# Patient Record
Sex: FEMALE | Race: WHITE | NOT HISPANIC OR LATINO
[De-identification: names, ages, dates, MRNs, and addresses within clinical notes are randomized per-mention and may not be internally consistent; named-entity substitution may affect disease eponyms.]

---

## 2017-09-01 ENCOUNTER — RX RENEWAL (OUTPATIENT)
Age: 62
End: 2017-09-01

## 2017-10-06 ENCOUNTER — RX RENEWAL (OUTPATIENT)
Age: 62
End: 2017-10-06

## 2017-10-09 ENCOUNTER — APPOINTMENT (OUTPATIENT)
Dept: NEUROLOGY | Facility: CLINIC | Age: 62
End: 2017-10-09
Payer: MEDICARE

## 2017-10-09 VITALS
HEIGHT: 64 IN | DIASTOLIC BLOOD PRESSURE: 64 MMHG | WEIGHT: 100 LBS | SYSTOLIC BLOOD PRESSURE: 128 MMHG | BODY MASS INDEX: 17.07 KG/M2

## 2017-10-09 DIAGNOSIS — F17.200 NICOTINE DEPENDENCE, UNSPECIFIED, UNCOMPLICATED: ICD-10-CM

## 2017-10-09 DIAGNOSIS — Z87.891 PERSONAL HISTORY OF NICOTINE DEPENDENCE: ICD-10-CM

## 2017-10-09 DIAGNOSIS — I67.1 CEREBRAL ANEURYSM, NONRUPTURED: ICD-10-CM

## 2017-10-09 PROCEDURE — 99214 OFFICE O/P EST MOD 30 MIN: CPT

## 2017-10-09 RX ORDER — UBIDECARENONE/VIT E ACET 100MG-5
50 MCG CAPSULE ORAL
Refills: 0 | Status: ACTIVE | COMMUNITY

## 2017-12-18 ENCOUNTER — APPOINTMENT (OUTPATIENT)
Dept: NEUROLOGY | Facility: CLINIC | Age: 62
End: 2017-12-18
Payer: MEDICARE

## 2017-12-18 VITALS
BODY MASS INDEX: 17.07 KG/M2 | WEIGHT: 100 LBS | DIASTOLIC BLOOD PRESSURE: 72 MMHG | HEIGHT: 64 IN | SYSTOLIC BLOOD PRESSURE: 124 MMHG

## 2017-12-18 PROCEDURE — 99213 OFFICE O/P EST LOW 20 MIN: CPT

## 2018-01-09 ENCOUNTER — APPOINTMENT (OUTPATIENT)
Dept: PHYSICAL MEDICINE AND REHAB | Facility: CLINIC | Age: 63
End: 2018-01-09

## 2018-01-16 ENCOUNTER — RX RENEWAL (OUTPATIENT)
Age: 63
End: 2018-01-16

## 2018-01-31 ENCOUNTER — APPOINTMENT (OUTPATIENT)
Dept: PHYSICAL MEDICINE AND REHAB | Facility: CLINIC | Age: 63
End: 2018-01-31
Payer: MEDICARE

## 2018-01-31 DIAGNOSIS — Z60.2 PROBLEMS RELATED TO LIVING ALONE: ICD-10-CM

## 2018-01-31 DIAGNOSIS — Z80.9 FAMILY HISTORY OF MALIGNANT NEOPLASM, UNSPECIFIED: ICD-10-CM

## 2018-01-31 DIAGNOSIS — Z56.0 UNEMPLOYMENT, UNSPECIFIED: ICD-10-CM

## 2018-01-31 PROCEDURE — 99205 OFFICE O/P NEW HI 60 MIN: CPT

## 2018-01-31 RX ORDER — MENTHOL/CAMPHOR 0.5 %-0.5%
1000 LOTION (ML) TOPICAL
Refills: 0 | Status: ACTIVE | COMMUNITY

## 2018-01-31 RX ORDER — METHIMAZOLE 10 MG/1
10 TABLET ORAL
Refills: 0 | Status: ACTIVE | COMMUNITY

## 2018-01-31 RX ORDER — ALENDRONATE SODIUM 70 MG/1
70 TABLET ORAL
Refills: 0 | Status: ACTIVE | COMMUNITY

## 2018-01-31 RX ORDER — MULTIVIT-MIN/FA/LYCOPEN/LUTEIN .4-300-25
TABLET ORAL
Refills: 0 | Status: ACTIVE | COMMUNITY

## 2018-01-31 SDOH — SOCIAL STABILITY - SOCIAL INSECURITY: PROBLEMS RELATED TO LIVING ALONE: Z60.2

## 2018-01-31 SDOH — ECONOMIC STABILITY - INCOME SECURITY: UNEMPLOYMENT, UNSPECIFIED: Z56.0

## 2018-02-17 ENCOUNTER — MEDICATION RENEWAL (OUTPATIENT)
Age: 63
End: 2018-02-17

## 2018-02-20 ENCOUNTER — MEDICATION RENEWAL (OUTPATIENT)
Age: 63
End: 2018-02-20

## 2018-02-20 RX ORDER — ARMODAFINIL 150 MG/1
150 TABLET ORAL DAILY
Qty: 30 | Refills: 5 | Status: COMPLETED | COMMUNITY
Start: 2018-01-30 | End: 2018-02-20

## 2018-02-20 RX ORDER — MODAFINIL 100 MG/1
100 TABLET ORAL
Refills: 0 | Status: COMPLETED | COMMUNITY
End: 2018-02-20

## 2018-03-19 ENCOUNTER — APPOINTMENT (OUTPATIENT)
Dept: NEUROLOGY | Facility: CLINIC | Age: 63
End: 2018-03-19
Payer: MEDICARE

## 2018-03-19 VITALS
DIASTOLIC BLOOD PRESSURE: 82 MMHG | HEIGHT: 64 IN | WEIGHT: 100 LBS | SYSTOLIC BLOOD PRESSURE: 126 MMHG | BODY MASS INDEX: 17.07 KG/M2

## 2018-03-19 DIAGNOSIS — M21.371 FOOT DROP, RIGHT FOOT: ICD-10-CM

## 2018-03-19 DIAGNOSIS — M21.372 FOOT DROP, RIGHT FOOT: ICD-10-CM

## 2018-03-19 PROCEDURE — 99214 OFFICE O/P EST MOD 30 MIN: CPT

## 2018-04-16 ENCOUNTER — MEDICATION RENEWAL (OUTPATIENT)
Age: 63
End: 2018-04-16

## 2018-05-21 ENCOUNTER — APPOINTMENT (OUTPATIENT)
Dept: NEUROLOGY | Facility: CLINIC | Age: 63
End: 2018-05-21
Payer: MEDICARE

## 2018-05-21 VITALS
WEIGHT: 100 LBS | HEIGHT: 64 IN | DIASTOLIC BLOOD PRESSURE: 62 MMHG | SYSTOLIC BLOOD PRESSURE: 110 MMHG | BODY MASS INDEX: 17.07 KG/M2

## 2018-05-21 DIAGNOSIS — R25.2 CRAMP AND SPASM: ICD-10-CM

## 2018-05-21 PROCEDURE — 99214 OFFICE O/P EST MOD 30 MIN: CPT

## 2018-07-24 ENCOUNTER — RX RENEWAL (OUTPATIENT)
Age: 63
End: 2018-07-24

## 2018-08-21 ENCOUNTER — APPOINTMENT (OUTPATIENT)
Dept: NEUROLOGY | Facility: CLINIC | Age: 63
End: 2018-08-21
Payer: MEDICARE

## 2018-08-21 PROCEDURE — 99213 OFFICE O/P EST LOW 20 MIN: CPT

## 2018-09-24 ENCOUNTER — MEDICATION RENEWAL (OUTPATIENT)
Age: 63
End: 2018-09-24

## 2018-11-07 ENCOUNTER — RX RENEWAL (OUTPATIENT)
Age: 63
End: 2018-11-07

## 2018-11-26 ENCOUNTER — APPOINTMENT (OUTPATIENT)
Dept: NEUROLOGY | Facility: CLINIC | Age: 63
End: 2018-11-26
Payer: MEDICARE

## 2018-11-26 VITALS
DIASTOLIC BLOOD PRESSURE: 60 MMHG | SYSTOLIC BLOOD PRESSURE: 115 MMHG | WEIGHT: 100 LBS | BODY MASS INDEX: 17.07 KG/M2 | HEIGHT: 64 IN

## 2018-11-26 PROCEDURE — 99213 OFFICE O/P EST LOW 20 MIN: CPT

## 2018-12-17 ENCOUNTER — MEDICATION RENEWAL (OUTPATIENT)
Age: 63
End: 2018-12-17

## 2018-12-21 ENCOUNTER — APPOINTMENT (OUTPATIENT)
Dept: NEUROLOGY | Facility: CLINIC | Age: 63
End: 2018-12-21
Payer: MEDICARE

## 2018-12-21 PROCEDURE — 96366 THER/PROPH/DIAG IV INF ADDON: CPT

## 2018-12-21 PROCEDURE — 96365 THER/PROPH/DIAG IV INF INIT: CPT

## 2019-02-06 ENCOUNTER — APPOINTMENT (OUTPATIENT)
Dept: NEUROLOGY | Facility: CLINIC | Age: 64
End: 2019-02-06
Payer: MEDICARE

## 2019-02-06 PROCEDURE — 96365 THER/PROPH/DIAG IV INF INIT: CPT

## 2019-02-06 PROCEDURE — 96366 THER/PROPH/DIAG IV INF ADDON: CPT

## 2019-02-19 ENCOUNTER — MEDICATION RENEWAL (OUTPATIENT)
Age: 64
End: 2019-02-19

## 2019-02-26 ENCOUNTER — APPOINTMENT (OUTPATIENT)
Dept: NEUROLOGY | Facility: CLINIC | Age: 64
End: 2019-02-26
Payer: MEDICARE

## 2019-02-26 VITALS
BODY MASS INDEX: 17.07 KG/M2 | DIASTOLIC BLOOD PRESSURE: 66 MMHG | WEIGHT: 100 LBS | HEIGHT: 64 IN | SYSTOLIC BLOOD PRESSURE: 108 MMHG

## 2019-02-26 PROCEDURE — 99213 OFFICE O/P EST LOW 20 MIN: CPT

## 2019-02-26 NOTE — ASSESSMENT
[FreeTextEntry1] : This is a 63-year-old woman with secondary progressive multiple sclerosis. She is to worsen. She is currently a paraplegic and has fallen. She is essentially homebound at this point as she is unable to for physical therapy she can make doctors appointments but not much else. I feel that she requires full-time supervision. I will request of evaluation for an home health aide. In the past she had been resistant to having someone come in her home she is no longer resistance. She will likely need 24 hour/7 days a week coverage. She would also benefit from physical and occupational therapy in the home. I will also try to change her steroid infusion show the home. I will see her back in the office in 3 months, sooner should the need arise.

## 2019-02-26 NOTE — HISTORY OF PRESENT ILLNESS
[FreeTextEntry1] : 10/9/17:\par This is a 61-year-old woman returns today for followup of multiple sclerosis. This point its likely secondary progressive multiple sclerosis. She continues on 3 times a week Rebif. She appeared to tolerate the medication well. She seems to be getting increasingly frustrated as her condition deteriorates.At her last visit we tried tizanidine for spasticity which did not help and she stopped it. She is complaining that she is barely able to move her legs at this point. She does get up and do cooking but she gets exhausted. She describes cooking stuff peppers as is that of a one-day process no three-day process for one day she'll cook to meet the next day she couldn't arise in the last patient appeared to peppers. She does state that she gets up and does move around as much as she can but fatigue sets in much easier than it used to. She is not having any vision problems at this point. Her arms are little bit weak but not nearly as weak as her legs. She also has a history the past of intracerebral aneurysm status post surgery which is currently asymptomatic. She is here today for neurologic followup.\par \par Followup December 18, 2017:\par This is a 8-year-old woman with secondary progressive multiple sclerosis. She is to have weakness in her legs. She feels that occasionally her knees locked up on her. Since her last visit she's got electric wheelchair and scooter. She is able to get around now a lot better. She is here today with this in mind for followup visit.\par \par Followup March 19, 2018:\par This is a 62-year-old woman returns today for followup of multiple strokes as. She currently likely to secondary progressive state with relapse. Her legs continue to be weak. She mostly uses a wheelchair to get around but can use a walker for short periods of time before she becomes too fatigued. She is dealing with a lot of MS-related fatigue as well as her leg weakness. She maintains good upper body strength. She continues to have poor appetite and weight loss despite trial of medical marijuana to increase her appetite. She is here today for followup.\par \par Followup May 21, 2018:\par This is a 62-year-old woman who follows up today with multiple sclerosis. She has likely secondary progressive with relapse. She now uses a motorized scooter outside the home. She can use a walker for limited amounts in the house. Otherwise she is using a motorized wheelchair with short turning radius in the house. She is not having any new issues other than some intermittent coldness and cramping into her left leg. She is here today for neurologic followup.\par \par Followup August 21, 2018:\par This is a 62-year-old woman with multiple sclerosis who follows up today. She likely has secondary progressive multiple sclerosis with relapse. She continues on read as without any issues. She's able take it 3 times a week is not reporting any adverse effects. She continues to have severe weakness in her legs and is using a motorized scooter. She takes that she can move around a little with her walker when in her home. She ha complains of more fatigue however she is only taking Provigil once a day to twice a day as prescribed. She is here today for neurologic followup.  \par \par Followup November 26, 2018:\par This is a 63-year-old woman with likely secondary progressive multiple sclerosis who presents today for followup. She continues to be on Rebif with no issues. She continues to have bilateral leg weakness. She is nonambulatory and bound either wheelchair or scooter. She goes to physical and occupational therapy outside the home. She is receiving bimonthly treatment subsided Medrol. She wishes to increase it to monthly. She is here today for neurologic followup.\par \par Followup February 26, 2019:\par This is a 63-year-old woman who presents today for followup of multiple sclerosis. At this point to secondary progressive multiple stresses. Since her last visit she has fallen. She was unable to get up and was on the floor for about 24 hours. She now recognizes it is no longer safe for her to be alone. She remains paraplegic and cannot lift her legs and has decreased sensation in her legs. She is still able to use her arms. She is reporting decreased appetite. Overall she appears to have deteriorated significantly since her November 2018 visit. She is no longer able to go to physical and occupational therapy. She will continue with steroid treatment.

## 2019-02-26 NOTE — CONSULT LETTER
[Dear  ___] : Dear  [unfilled], [Courtesy Letter:] : I had the pleasure of seeing your patient, [unfilled], in my office today. [Please see my note below.] : Please see my note below. [Consult Closing:] : Thank you very much for allowing me to participate in the care of this patient.  If you have any questions, please do not hesitate to contact me. [Sincerely,] : Sincerely, [FreeTextEntry3] : Oscar Manuel M.D., Ph.D. DPN-N\par [facility]\par Neurology at [place]\par Medical Director of Stroke Services\par [facility]\par

## 2019-02-26 NOTE — REVIEW OF SYSTEMS
[As Noted in HPI] : as noted in HPI [Leg Weakness] : leg weakness [Inability to Walk] : inability to walk [Negative] : Heme/Lymph

## 2019-02-26 NOTE — PHYSICAL EXAM
[Person] : oriented to person [Place] : oriented to place [Time] : oriented to time [Remote Intact] : remote memory intact [Registration Intact] : recent registration memory intact [Span Intact] : the attention span was normal [Concentration Intact] : normal concentrating ability [Visual Intact] : visual attention was ~T not ~L decreased [Naming Objects] : no difficulty naming common objects [Repeating Phrases] : no difficulty repeating a phrase [Fluency] : fluency intact [Comprehension] : comprehension intact [Current Events] : adequate knowledge of current events [Past History] : adequate knowledge of personal past history [Cranial Nerves Optic (II)] : visual acuity intact bilaterally,  visual fields full to confrontation, pupils equal round and reactive to light [Cranial Nerves Oculomotor (III)] : extraocular motion intact [Cranial Nerves Trigeminal (V)] : facial sensation intact symmetrically [Cranial Nerves Facial (VII)] : face symmetrical [Cranial Nerves Vestibulocochlear (VIII)] : hearing was intact bilaterally [Cranial Nerves Glossopharyngeal (IX)] : tongue and palate midline [Cranial Nerves Accessory (XI - Cranial And Spinal)] : head turning and shoulder shrug symmetric [Cranial Nerves Hypoglossal (XII)] : there was no tongue deviation with protrusion [Involuntary Movements] : no involuntary movements were seen [No Muscle Atrophy] : normal bulk in all four extremities [Paraparesis (Lower Extremities)] : the patient was paraplegic [Paresis Pronator Drift Right-Sided] : no pronator drift on the right [Paresis Pronator Drift Left-Sided] : no pronator drift on the left [Motor Strength Upper Extremities Bilaterally] : strength was normal in both upper extremities [Sensation Tactile Decrease] : light touch was intact [Sensation Pain / Temperature Decrease] : pain and temperature was intact [Sensation Vibration Decrease] : vibration was intact [Proprioception] : proprioception was intact [Tremor] : no tremor present [Coordination - Dysmetria Impaired Finger-to-Nose Bilateral] : not present [3+] : Patella left 3+ [FreeTextEntry6] : Mild bilateral upper extremity weakness, no movement in lower extremities [FreeTextEntry8] : In wheelchair [Sclera] : the sclera and conjunctiva were normal [PERRL With Normal Accommodation] : pupils were equal in size, round, reactive to light, with normal accommodation [Extraocular Movements] : extraocular movements were intact [No APD] : no afferent pupillary defect [No ZOILA] : no internuclear ophthalmoplegia [Full Visual Field] : full visual field

## 2019-03-08 ENCOUNTER — APPOINTMENT (OUTPATIENT)
Dept: NEUROLOGY | Facility: CLINIC | Age: 64
End: 2019-03-08

## 2019-03-08 ENCOUNTER — RX RENEWAL (OUTPATIENT)
Age: 64
End: 2019-03-08

## 2019-05-06 ENCOUNTER — RX RENEWAL (OUTPATIENT)
Age: 64
End: 2019-05-06

## 2019-05-28 ENCOUNTER — APPOINTMENT (OUTPATIENT)
Dept: NEUROLOGY | Facility: CLINIC | Age: 64
End: 2019-05-28
Payer: MEDICARE

## 2019-05-28 VITALS
SYSTOLIC BLOOD PRESSURE: 118 MMHG | WEIGHT: 100 LBS | HEIGHT: 63.5 IN | DIASTOLIC BLOOD PRESSURE: 66 MMHG | BODY MASS INDEX: 17.5 KG/M2

## 2019-05-28 PROCEDURE — 99213 OFFICE O/P EST LOW 20 MIN: CPT

## 2019-05-28 NOTE — HISTORY OF PRESENT ILLNESS
[FreeTextEntry1] : 10/9/17:\par This is a 61-year-old woman returns today for followup of multiple sclerosis. This point its likely secondary progressive multiple sclerosis. She continues on 3 times a week Rebif. She appeared to tolerate the medication well. She seems to be getting increasingly frustrated as her condition deteriorates.At her last visit we tried tizanidine for spasticity which did not help and she stopped it. She is complaining that she is barely able to move her legs at this point. She does get up and do cooking but she gets exhausted. She describes cooking stuff peppers as is that of a one-day process no three-day process for one day she'll cook to meet the next day she couldn't arise in the last patient appeared to peppers. She does state that she gets up and does move around as much as she can but fatigue sets in much easier than it used to. She is not having any vision problems at this point. Her arms are little bit weak but not nearly as weak as her legs. She also has a history the past of intracerebral aneurysm status post surgery which is currently asymptomatic. She is here today for neurologic followup.\par \par Followup December 18, 2017:\par This is a 8-year-old woman with secondary progressive multiple sclerosis. She is to have weakness in her legs. She feels that occasionally her knees locked up on her. Since her last visit she's got electric wheelchair and scooter. She is able to get around now a lot better. She is here today with this in mind for followup visit.\par \par Followup March 19, 2018:\par This is a 62-year-old woman returns today for followup of multiple strokes as. She currently likely to secondary progressive state with relapse. Her legs continue to be weak. She mostly uses a wheelchair to get around but can use a walker for short periods of time before she becomes too fatigued. She is dealing with a lot of MS-related fatigue as well as her leg weakness. She maintains good upper body strength. She continues to have poor appetite and weight loss despite trial of medical marijuana to increase her appetite. She is here today for followup.\par \par Followup May 21, 2018:\par This is a 62-year-old woman who follows up today with multiple sclerosis. She has likely secondary progressive with relapse. She now uses a motorized scooter outside the home. She can use a walker for limited amounts in the house. Otherwise she is using a motorized wheelchair with short turning radius in the house. She is not having any new issues other than some intermittent coldness and cramping into her left leg. She is here today for neurologic followup.\par \par Followup August 21, 2018:\par This is a 62-year-old woman with multiple sclerosis who follows up today. She likely has secondary progressive multiple sclerosis with relapse. She continues on read as without any issues. She's able take it 3 times a week is not reporting any adverse effects. She continues to have severe weakness in her legs and is using a motorized scooter. She takes that she can move around a little with her walker when in her home. She ha complains of more fatigue however she is only taking Provigil once a day to twice a day as prescribed. She is here today for neurologic followup.  \par \par Followup November 26, 2018:\par This is a 63-year-old woman with likely secondary progressive multiple sclerosis who presents today for followup. She continues to be on Rebif with no issues. She continues to have bilateral leg weakness. She is nonambulatory and bound either wheelchair or scooter. She goes to physical and occupational therapy outside the home. She is receiving bimonthly treatment subsided Medrol. She wishes to increase it to monthly. She is here today for neurologic followup.\par \par Followup February 26, 2019:\par This is a 63-year-old woman who presents today for followup of multiple sclerosis. At this point to secondary progressive multiple stresses. Since her last visit she has fallen. She was unable to get up and was on the floor for about 24 hours. She now recognizes it is no longer safe for her to be alone. She remains paraplegic and cannot lift her legs and has decreased sensation in her legs. She is still able to use her arms. She is reporting decreased appetite. Overall she appears to have deteriorated significantly since her November 2018 visit. She is no longer able to go to physical and occupational therapy. She will continue with steroid treatment.\par \par Followup May 28, 2019:\par This is a 63-year-old woman who presents today for followup of multiple sclerosis. It is likely secondary progressive stage. She continues to be paraplegic. She is confined to a wheelchair at this point. She tried physical therapy in the home which is not as good as outpatient physical therapy. She wishes to try to go back to outpatient physical and occupational therapies. She is here today for neurologic followup.

## 2019-05-28 NOTE — PHYSICAL EXAM
[Place] : oriented to place [Time] : oriented to time [Person] : oriented to person [Remote Intact] : remote memory intact [Span Intact] : the attention span was normal [Registration Intact] : recent registration memory intact [Visual Intact] : visual attention was ~T not ~L decreased [Naming Objects] : no difficulty naming common objects [Repeating Phrases] : no difficulty repeating a phrase [Concentration Intact] : normal concentrating ability [Fluency] : fluency intact [Comprehension] : comprehension intact [Current Events] : adequate knowledge of current events [Cranial Nerves Optic (II)] : visual acuity intact bilaterally,  visual fields full to confrontation, pupils equal round and reactive to light [Past History] : adequate knowledge of personal past history [Cranial Nerves Oculomotor (III)] : extraocular motion intact [Cranial Nerves Vestibulocochlear (VIII)] : hearing was intact bilaterally [Cranial Nerves Facial (VII)] : face symmetrical [Cranial Nerves Trigeminal (V)] : facial sensation intact symmetrically [Involuntary Movements] : no involuntary movements were seen [Cranial Nerves Hypoglossal (XII)] : there was no tongue deviation with protrusion [Cranial Nerves Accessory (XI - Cranial And Spinal)] : head turning and shoulder shrug symmetric [Cranial Nerves Glossopharyngeal (IX)] : tongue and palate midline [Paresis Pronator Drift Right-Sided] : no pronator drift on the right [Paraparesis (Lower Extremities)] : the patient was paraplegic [No Muscle Atrophy] : normal bulk in all four extremities [Motor Strength Upper Extremities Bilaterally] : strength was normal in both upper extremities [Paresis Pronator Drift Left-Sided] : no pronator drift on the left [Sensation Tactile Decrease] : light touch was intact [Proprioception] : proprioception was intact [Sensation Pain / Temperature Decrease] : pain and temperature was intact [Sensation Vibration Decrease] : vibration was intact [Coordination - Dysmetria Impaired Finger-to-Nose Bilateral] : not present [Tremor] : no tremor present [3+] : Patella right 3+ [FreeTextEntry8] : In wheelchair [FreeTextEntry6] : Mild bilateral upper extremity weakness, no movement in lower extremities [Sclera] : the sclera and conjunctiva were normal [PERRL With Normal Accommodation] : pupils were equal in size, round, reactive to light, with normal accommodation [Extraocular Movements] : extraocular movements were intact [No APD] : no afferent pupillary defect [No ZOILA] : no internuclear ophthalmoplegia [Full Visual Field] : full visual field

## 2019-05-28 NOTE — CONSULT LETTER
[Dear  ___] : Dear  [unfilled], [Please see my note below.] : Please see my note below. [Courtesy Letter:] : I had the pleasure of seeing your patient, [unfilled], in my office today. [Consult Closing:] : Thank you very much for allowing me to participate in the care of this patient.  If you have any questions, please do not hesitate to contact me. [FreeTextEntry3] : Oscar Manuel M.D., Ph.D. DPN-N\par [facility]\par Neurology at [place]\par Medical Director of Stroke Services\par [facility]\par  [Sincerely,] : Sincerely,

## 2019-05-28 NOTE — ASSESSMENT
[FreeTextEntry1] : This is a 63-year-old woman with multiple sclerosis. She is currently stable since her last visit 3 months ago. At this point we'll continue current treatment and I have given her a prescription for physical and occupational therapy as an outpatient. I will see her back for followup in 3 months, sooner should the need arise.

## 2019-08-27 ENCOUNTER — APPOINTMENT (OUTPATIENT)
Dept: NEUROLOGY | Facility: CLINIC | Age: 64
End: 2019-08-27

## 2019-09-09 ENCOUNTER — MEDICATION RENEWAL (OUTPATIENT)
Age: 64
End: 2019-09-09

## 2019-09-17 ENCOUNTER — APPOINTMENT (OUTPATIENT)
Dept: NEUROLOGY | Facility: CLINIC | Age: 64
End: 2019-09-17
Payer: MEDICARE

## 2019-09-17 VITALS
DIASTOLIC BLOOD PRESSURE: 70 MMHG | HEIGHT: 63.5 IN | WEIGHT: 93 LBS | BODY MASS INDEX: 16.27 KG/M2 | SYSTOLIC BLOOD PRESSURE: 100 MMHG

## 2019-09-17 PROCEDURE — 99214 OFFICE O/P EST MOD 30 MIN: CPT

## 2019-09-17 NOTE — CONSULT LETTER
[Dear  ___] : Dear  [unfilled], [Please see my note below.] : Please see my note below. [Courtesy Letter:] : I had the pleasure of seeing your patient, [unfilled], in my office today. [Sincerely,] : Sincerely, [Consult Closing:] : Thank you very much for allowing me to participate in the care of this patient.  If you have any questions, please do not hesitate to contact me. [FreeTextEntry3] : Oscar Manuel M.D., Ph.D. DPN-N\par [facility]\par Neurology at [place]\par Medical Director of Stroke Services\par [facility]\par

## 2019-09-17 NOTE — HISTORY OF PRESENT ILLNESS
[FreeTextEntry1] : 10/9/17:\par This is a 61-year-old woman returns today for followup of multiple sclerosis. This point its likely secondary progressive multiple sclerosis. She continues on 3 times a week Rebif. She appeared to tolerate the medication well. She seems to be getting increasingly frustrated as her condition deteriorates.At her last visit we tried tizanidine for spasticity which did not help and she stopped it. She is complaining that she is barely able to move her legs at this point. She does get up and do cooking but she gets exhausted. She describes cooking stuff peppers as is that of a one-day process no three-day process for one day she'll cook to meet the next day she couldn't arise in the last patient appeared to peppers. She does state that she gets up and does move around as much as she can but fatigue sets in much easier than it used to. She is not having any vision problems at this point. Her arms are little bit weak but not nearly as weak as her legs. She also has a history the past of intracerebral aneurysm status post surgery which is currently asymptomatic. She is here today for neurologic followup.\par \par Followup December 18, 2017:\par This is a 8-year-old woman with secondary progressive multiple sclerosis. She is to have weakness in her legs. She feels that occasionally her knees locked up on her. Since her last visit she's got electric wheelchair and scooter. She is able to get around now a lot better. She is here today with this in mind for followup visit.\par \par Followup March 19, 2018:\par This is a 62-year-old woman returns today for followup of multiple strokes as. She currently likely to secondary progressive state with relapse. Her legs continue to be weak. She mostly uses a wheelchair to get around but can use a walker for short periods of time before she becomes too fatigued. She is dealing with a lot of MS-related fatigue as well as her leg weakness. She maintains good upper body strength. She continues to have poor appetite and weight loss despite trial of medical marijuana to increase her appetite. She is here today for followup.\par \par Followup May 21, 2018:\par This is a 62-year-old woman who follows up today with multiple sclerosis. She has likely secondary progressive with relapse. She now uses a motorized scooter outside the home. She can use a walker for limited amounts in the house. Otherwise she is using a motorized wheelchair with short turning radius in the house. She is not having any new issues other than some intermittent coldness and cramping into her left leg. She is here today for neurologic followup.\par \par Followup August 21, 2018:\par This is a 62-year-old woman with multiple sclerosis who follows up today. She likely has secondary progressive multiple sclerosis with relapse. She continues on read as without any issues. She's able take it 3 times a week is not reporting any adverse effects. She continues to have severe weakness in her legs and is using a motorized scooter. She takes that she can move around a little with her walker when in her home. She ha complains of more fatigue however she is only taking Provigil once a day to twice a day as prescribed. She is here today for neurologic followup.  \par \par Followup November 26, 2018:\par This is a 63-year-old woman with likely secondary progressive multiple sclerosis who presents today for followup. She continues to be on Rebif with no issues. She continues to have bilateral leg weakness. She is nonambulatory and bound either wheelchair or scooter. She goes to physical and occupational therapy outside the home. She is receiving bimonthly treatment subsided Medrol. She wishes to increase it to monthly. She is here today for neurologic followup.\par \par Followup February 26, 2019:\par This is a 63-year-old woman who presents today for followup of multiple sclerosis. At this point to secondary progressive multiple stresses. Since her last visit she has fallen. She was unable to get up and was on the floor for about 24 hours. She now recognizes it is no longer safe for her to be alone. She remains paraplegic and cannot lift her legs and has decreased sensation in her legs. She is still able to use her arms. She is reporting decreased appetite. Overall she appears to have deteriorated significantly since her November 2018 visit. She is no longer able to go to physical and occupational therapy. She will continue with steroid treatment.\par \par Followup May 28, 2019:\par This is a 63-year-old woman who presents today for followup of multiple sclerosis. It is likely secondary progressive stage. She continues to be paraplegic. She is confined to a wheelchair at this point. She tried physical therapy in the home which is not as good as outpatient physical therapy. She wishes to try to go back to outpatient physical and occupational therapies. She is here today for neurologic followup.\par \par Followup September 17, 2019:\par This is a 63-year-old woman who presents today for followup of multiple sclerosis. At this point in secondary progressive multiple sclerosis. She continues to have paraplegia with diffuse weakness and also involving her upper extremities. She is complaining of lower extremity edema. He tried stockings which she did not care for her because it was uncomfortable. She states that she's been on diuretics as well with minimal relief. She states that the additional weight of the edema is causing her difficulty in transferring from wheelchair to commode. She is fallen a few times on wheelchair when she's been unsupervised. Currently she private pay three-eighths this to be with her for varying amounts of hours during the day. She is having increasingly worsening problems at night because she does have assistance. She is unable to go to physical or occupational therapy due to the falls and difficulty getting out of the home. She is here today for neurologic followup.

## 2019-09-17 NOTE — PHYSICAL EXAM
[Place] : oriented to place [Person] : oriented to person [Remote Intact] : remote memory intact [Time] : oriented to time [Registration Intact] : recent registration memory intact [Concentration Intact] : normal concentrating ability [Span Intact] : the attention span was normal [Visual Intact] : visual attention was ~T not ~L decreased [Naming Objects] : no difficulty naming common objects [Repeating Phrases] : no difficulty repeating a phrase [Fluency] : fluency intact [Current Events] : adequate knowledge of current events [Comprehension] : comprehension intact [Past History] : adequate knowledge of personal past history [Cranial Nerves Optic (II)] : visual acuity intact bilaterally,  visual fields full to confrontation, pupils equal round and reactive to light [Cranial Nerves Oculomotor (III)] : extraocular motion intact [Cranial Nerves Vestibulocochlear (VIII)] : hearing was intact bilaterally [Cranial Nerves Trigeminal (V)] : facial sensation intact symmetrically [Cranial Nerves Facial (VII)] : face symmetrical [Cranial Nerves Accessory (XI - Cranial And Spinal)] : head turning and shoulder shrug symmetric [Cranial Nerves Glossopharyngeal (IX)] : tongue and palate midline [Cranial Nerves Hypoglossal (XII)] : there was no tongue deviation with protrusion [Involuntary Movements] : no involuntary movements were seen [Paraparesis (Lower Extremities)] : the patient was paraplegic [Motor Strength Lower Extremities Bilaterally] : there was weakness in both lower extremities [Sensation Tactile Decrease] : light touch was intact [Sensation Pain / Temperature Decrease] : pain and temperature was intact [Non-ambulatory] : Non-ambulatory [Sensation Vibration Decrease] : vibration was intact [Proprioception] : proprioception was intact [3+] : Patella right 3+ [PERRL With Normal Accommodation] : pupils were equal in size, round, reactive to light, with normal accommodation [Sclera] : the sclera and conjunctiva were normal [Extraocular Movements] : extraocular movements were intact [No ZOILA] : no internuclear ophthalmoplegia [No APD] : no afferent pupillary defect [Full Visual Field] : full visual field [Paresis Pronator Drift Right-Sided] : no pronator drift on the right [Paresis Pronator Drift Left-Sided] : no pronator drift on the left [Motor Strength Upper Extremities Bilaterally] : strength was normal in both upper extremities [Tremor] : no tremor present [Coordination - Dysmetria Impaired Finger-to-Nose Bilateral] : not present [FreeTextEntry6] : Mild bilateral upper extremity weakness, no movement in lower extremities [FreeTextEntry8] : In wheelchair

## 2019-09-17 NOTE — ASSESSMENT
[FreeTextEntry1] : This is a 63-year-old woman with secondary progressive multiple sclerosis. At this time is unsafe for her to be at home alone. I will make efforts U. assist her in getting 24 7 coverage with home health aides. She is ready fall and needed to call neighbors to get her out of harms way and has injured herself as a result of these falls. She will continue rebate for the multiple sclerosis as well as pulsed steroids. We'll reduce the steroid dose to every other month and she states she has mild osteoporosis. I will see her back in the office in 3 months, sooner should the need arise.

## 2019-09-29 ENCOUNTER — RX RENEWAL (OUTPATIENT)
Age: 64
End: 2019-09-29

## 2019-12-17 ENCOUNTER — APPOINTMENT (OUTPATIENT)
Dept: NEUROLOGY | Facility: CLINIC | Age: 64
End: 2019-12-17

## 2020-02-10 ENCOUNTER — RX RENEWAL (OUTPATIENT)
Age: 65
End: 2020-02-10

## 2021-01-12 ENCOUNTER — RX RENEWAL (OUTPATIENT)
Age: 66
End: 2021-01-12

## 2021-01-21 ENCOUNTER — APPOINTMENT (OUTPATIENT)
Dept: NEUROLOGY | Facility: CLINIC | Age: 66
End: 2021-01-21
Payer: MEDICARE

## 2021-01-21 DIAGNOSIS — G82.20 PARAPLEGIA, UNSPECIFIED: ICD-10-CM

## 2021-01-21 PROCEDURE — 99213 OFFICE O/P EST LOW 20 MIN: CPT | Mod: 95

## 2021-01-21 RX ORDER — MODAFINIL 100 MG/1
100 TABLET ORAL
Qty: 180 | Refills: 1 | Status: COMPLETED | COMMUNITY
Start: 2018-02-20 | End: 2021-01-21

## 2021-01-21 RX ORDER — INTERFERON BETA-1A 44 UG/.5ML
44 INJECTION, SOLUTION SUBCUTANEOUS
Refills: 0 | Status: COMPLETED | COMMUNITY
End: 2021-01-21

## 2021-01-21 RX ORDER — METHYLPREDNISOLONE SODIUM SUCCINATE 1 G/16ML
1000 INJECTION, POWDER, LYOPHILIZED, FOR SOLUTION INTRAMUSCULAR; INTRAVENOUS
Qty: 1000 | Refills: 5 | Status: COMPLETED | COMMUNITY
Start: 2018-11-30 | End: 2021-01-21

## 2021-01-21 NOTE — CONSULT LETTER
[Dear  ___] : Dear  [unfilled], [Courtesy Letter:] : I had the pleasure of seeing your patient, [unfilled], in my office today. [Please see my note below.] : Please see my note below. [Consult Closing:] : Thank you very much for allowing me to participate in the care of this patient.  If you have any questions, please do not hesitate to contact me. [Sincerely,] : Sincerely, [FreeTextEntry3] : Oscar Manuel M.D., Ph.D. DPN-N\par [facility]\par Neurology at [place]\par Medical Director of Stroke Services\par [facility]\par

## 2021-01-21 NOTE — HISTORY OF PRESENT ILLNESS
[Home] : at home, [unfilled] , at the time of the visit. [Medical Office: ([facility])___] : at the medical office located in  [Formal Caregiver] : formal caregiver [Verbal consent obtained from patient] : the patient, [unfilled] [FreeTextEntry1] : 10/9/17:\par This is a 61-year-old woman returns today for followup of multiple sclerosis. This point its likely secondary progressive multiple sclerosis. She continues on 3 times a week Rebif. She appeared to tolerate the medication well. She seems to be getting increasingly frustrated as her condition deteriorates.At her last visit we tried tizanidine for spasticity which did not help and she stopped it. She is complaining that she is barely able to move her legs at this point. She does get up and do cooking but she gets exhausted. She describes cooking stuff peppers as is that of a one-day process no three-day process for one day she'll cook to meet the next day she couldn't arise in the last patient appeared to peppers. She does state that she gets up and does move around as much as she can but fatigue sets in much easier than it used to. She is not having any vision problems at this point. Her arms are little bit weak but not nearly as weak as her legs. She also has a history the past of intracerebral aneurysm status post surgery which is currently asymptomatic. She is here today for neurologic followup.\par \par Followup December 18, 2017:\par This is a 8-year-old woman with secondary progressive multiple sclerosis. She is to have weakness in her legs. She feels that occasionally her knees locked up on her. Since her last visit she's got electric wheelchair and scooter. She is able to get around now a lot better. She is here today with this in mind for followup visit.\par \par Followup March 19, 2018:\par This is a 62-year-old woman returns today for followup of multiple strokes as. She currently likely to secondary progressive state with relapse. Her legs continue to be weak. She mostly uses a wheelchair to get around but can use a walker for short periods of time before she becomes too fatigued. She is dealing with a lot of MS-related fatigue as well as her leg weakness. She maintains good upper body strength. She continues to have poor appetite and weight loss despite trial of medical marijuana to increase her appetite. She is here today for followup.\par \par Followup May 21, 2018:\par This is a 62-year-old woman who follows up today with multiple sclerosis. She has likely secondary progressive with relapse. She now uses a motorized scooter outside the home. She can use a walker for limited amounts in the house. Otherwise she is using a motorized wheelchair with short turning radius in the house. She is not having any new issues other than some intermittent coldness and cramping into her left leg. She is here today for neurologic followup.\par \par Followup August 21, 2018:\par This is a 62-year-old woman with multiple sclerosis who follows up today. She likely has secondary progressive multiple sclerosis with relapse. She continues on read as without any issues. She's able take it 3 times a week is not reporting any adverse effects. She continues to have severe weakness in her legs and is using a motorized scooter. She takes that she can move around a little with her walker when in her home. She ha complains of more fatigue however she is only taking Provigil once a day to twice a day as prescribed. She is here today for neurologic followup.  \par \par Followup November 26, 2018:\par This is a 63-year-old woman with likely secondary progressive multiple sclerosis who presents today for followup. She continues to be on Rebif with no issues. She continues to have bilateral leg weakness. She is nonambulatory and bound either wheelchair or scooter. She goes to physical and occupational therapy outside the home. She is receiving bimonthly treatment subsided Medrol. She wishes to increase it to monthly. She is here today for neurologic followup.\par \par Followup February 26, 2019:\par This is a 63-year-old woman who presents today for followup of multiple sclerosis. At this point to secondary progressive multiple stresses. Since her last visit she has fallen. She was unable to get up and was on the floor for about 24 hours. She now recognizes it is no longer safe for her to be alone. She remains paraplegic and cannot lift her legs and has decreased sensation in her legs. She is still able to use her arms. She is reporting decreased appetite. Overall she appears to have deteriorated significantly since her November 2018 visit. She is no longer able to go to physical and occupational therapy. She will continue with steroid treatment.\par \par Followup May 28, 2019:\par This is a 63-year-old woman who presents today for followup of multiple sclerosis. It is likely secondary progressive stage. She continues to be paraplegic. She is confined to a wheelchair at this point. She tried physical therapy in the home which is not as good as outpatient physical therapy. She wishes to try to go back to outpatient physical and occupational therapies. She is here today for neurologic followup.\par \par Followup September 17, 2019:\par This is a 63-year-old woman who presents today for followup of multiple sclerosis. At this point in secondary progressive multiple sclerosis. She continues to have paraplegia with diffuse weakness and also involving her upper extremities. She is complaining of lower extremity edema. He tried stockings which she did not care for her because it was uncomfortable. She states that she's been on diuretics as well with minimal relief. She states that the additional weight of the edema is causing her difficulty in transferring from wheelchair to commode. She is fallen a few times on wheelchair when she's been unsupervised. Currently she private pay three-eighths this to be with her for varying amounts of hours during the day. She is having increasingly worsening problems at night because she does have assistance. She is unable to go to physical or occupational therapy due to the falls and difficulty getting out of the home. She is here today for neurologic followup.\par \par Followup January 21, 2021:\par This is a 65-year-old woman who presents today for followup of multiple sclerosis. She is seen by video visit during the corona virus outbreak. Verbal consent is obtained at the beginning of the visit. She has not been seen since September of 2019. She continues to have what appears to be secondary progressive multiple sclerosis. Her leg weakness has gotten to a point now where she cannot ambulate with a rolling walker and is only using a wheelchair. He has having increasing weakness in her arms as well which is limiting her ability to transfer. She is relying on the assistance of an 8 to get in and out of bed and in and out of a chair. She states that she has fallen and has been in rehabilitation subsequent to her fall. She continues to take her Rebif 3 times a week and does not wish to switch medication. She is also utilizing gabapentin 1200 mg 2 to 3 times a day. She's here today for neurologic followup by video visit.

## 2021-01-21 NOTE — REVIEW OF SYSTEMS
[As Noted in HPI] : as noted in HPI [Arm Weakness] : arm weakness [Leg Weakness] : leg weakness [Inability to Walk] : inability to walk [Negative] : Heme/Lymph

## 2021-01-21 NOTE — ASSESSMENT
[FreeTextEntry1] : This is a 65-year-old woman with secondary progressive multiple sclerosis. At this point she continues to have full worsening weakness in her legs and her arms are now affected. She is reliant on a full-time care with aids to help assist with transferring which is now impossible. She wishes to continue Rebif as opposed to switching to newer medications. She will continue Rebif 44 mcg 3 times per week. I have renewed her gabapentin 1200 mg to 3 times per day. I will see her back in 6 months, sooner should the need arise.

## 2021-01-21 NOTE — PHYSICAL EXAM
[FreeTextEntry1] : Neurologic examination was limited due to the video call.\par \par Mental status: Awake and alert fully oriented to person place and time. There is no aphasia.\par \par Cranial nerves:  Full extraocular movements. There is no nystagmus. Normal facial sensation and motor function. Tongue was in the midline.\par \par Motor: no pronator drift  bilaterally., b/l UE weakness is reported\par \par Sensation: Light touch was intact \par \par Coordination: Deferred\par \par Gait: paraplegic\par

## 2022-10-29 NOTE — REVIEW OF SYSTEMS
no [As Noted in HPI] : as noted in HPI [Leg Weakness] : leg weakness [Inability to Walk] : inability to walk [Ataxia] : ataxia [Negative] : Integumentary

## 2023-01-01 ENCOUNTER — APPOINTMENT (OUTPATIENT)
Dept: OTOLARYNGOLOGY | Facility: CLINIC | Age: 68
End: 2023-01-01
Payer: MEDICARE

## 2023-01-01 ENCOUNTER — APPOINTMENT (OUTPATIENT)
Dept: NEUROLOGY | Facility: CLINIC | Age: 68
End: 2023-01-01
Payer: MEDICARE

## 2023-01-01 ENCOUNTER — APPOINTMENT (OUTPATIENT)
Dept: OTOLARYNGOLOGY | Facility: CLINIC | Age: 68
End: 2023-01-01

## 2023-01-01 ENCOUNTER — RX CHANGE (OUTPATIENT)
Age: 68
End: 2023-01-01

## 2023-01-01 ENCOUNTER — NON-APPOINTMENT (OUTPATIENT)
Age: 68
End: 2023-01-01

## 2023-01-01 VITALS
SYSTOLIC BLOOD PRESSURE: 108 MMHG | BODY MASS INDEX: 16.48 KG/M2 | DIASTOLIC BLOOD PRESSURE: 70 MMHG | HEIGHT: 63 IN | WEIGHT: 93 LBS

## 2023-01-01 VITALS
BODY MASS INDEX: 16.83 KG/M2 | HEIGHT: 63 IN | WEIGHT: 95 LBS | HEART RATE: 81 BPM | SYSTOLIC BLOOD PRESSURE: 142 MMHG | DIASTOLIC BLOOD PRESSURE: 88 MMHG

## 2023-01-01 VITALS
HEIGHT: 63 IN | DIASTOLIC BLOOD PRESSURE: 70 MMHG | BODY MASS INDEX: 16.83 KG/M2 | WEIGHT: 95 LBS | SYSTOLIC BLOOD PRESSURE: 104 MMHG

## 2023-01-01 VITALS
DIASTOLIC BLOOD PRESSURE: 70 MMHG | HEIGHT: 63 IN | SYSTOLIC BLOOD PRESSURE: 100 MMHG | WEIGHT: 95 LBS | BODY MASS INDEX: 16.83 KG/M2

## 2023-01-01 DIAGNOSIS — G35 MULTIPLE SCLEROSIS: ICD-10-CM

## 2023-01-01 LAB
DEPRECATED KAPPA LC FREE/LAMBDA SER: 1.25 RATIO
HBV CORE IGG+IGM SER QL: NONREACTIVE
HBV CORE IGM SER QL: NONREACTIVE
HBV SURFACE AB SER QL: NONREACTIVE
HBV SURFACE AG SER QL: NONREACTIVE
IGA SER QL IEP: 180 MG/DL
IGA SER QL IEP: 180 MG/DL
IGG SER QL IEP: 1103 MG/DL
IGM SER QL IEP: 192 MG/DL
IGM SER QL IEP: 192 MG/DL
KAPPA LC CSF-MCNC: 1.64 MG/DL
KAPPA LC SERPL-MCNC: 2.05 MG/DL

## 2023-01-01 PROCEDURE — 92507 TX SP LANG VOICE COMM INDIV: CPT | Mod: GN

## 2023-01-01 PROCEDURE — 92610 EVALUATE SWALLOWING FUNCTION: CPT | Mod: GN

## 2023-01-01 PROCEDURE — 99204 OFFICE O/P NEW MOD 45 MIN: CPT | Mod: 25

## 2023-01-01 PROCEDURE — 99213 OFFICE O/P EST LOW 20 MIN: CPT

## 2023-01-01 PROCEDURE — 31575 DIAGNOSTIC LARYNGOSCOPY: CPT

## 2023-01-01 PROCEDURE — 99214 OFFICE O/P EST MOD 30 MIN: CPT

## 2023-01-01 RX ORDER — OCRELIZUMAB 300 MG/10ML
300 INJECTION INTRAVENOUS
Qty: 2 | Refills: 0 | Status: COMPLETED | COMMUNITY
Start: 2023-01-01 | End: 2023-01-01

## 2023-01-01 RX ORDER — GABAPENTIN 600 MG/1
600 TABLET, COATED ORAL
Qty: 360 | Refills: 3 | Status: DISCONTINUED | COMMUNITY
Start: 2018-01-16 | End: 2023-01-01

## 2023-01-01 RX ORDER — GABAPENTIN 300 MG/1
300 CAPSULE ORAL 3 TIMES DAILY
Qty: 180 | Refills: 5 | Status: ACTIVE | COMMUNITY
Start: 2023-01-01 | End: 1900-01-01

## 2023-01-01 RX ORDER — OCRELIZUMAB 300 MG/10ML
300 INJECTION INTRAVENOUS
Qty: 2 | Refills: 1 | Status: COMPLETED | COMMUNITY
Start: 2023-01-01 | End: 2023-01-01

## 2023-01-01 RX ORDER — GABAPENTIN 400 MG/1
CAPSULE ORAL
Refills: 0 | Status: DISCONTINUED | COMMUNITY
End: 2023-01-01

## 2023-01-01 RX ORDER — MEGESTROL ACETATE 20 MG/1
20 TABLET ORAL DAILY
Qty: 30 | Refills: 5 | Status: DISCONTINUED | COMMUNITY
Start: 2023-01-01 | End: 2023-01-01

## 2023-01-01 RX ORDER — INTERFERON BETA-1A 44 UG/.5ML
INJECTION, SOLUTION SUBCUTANEOUS
Qty: 36 | Refills: 3 | Status: DISCONTINUED | COMMUNITY
Start: 2019-03-08 | End: 2023-01-01

## 2023-01-01 RX ORDER — MEGESTROL ACETATE 20 MG/1
20 TABLET ORAL
Qty: 90 | Refills: 1 | Status: ACTIVE | COMMUNITY
Start: 1900-01-01 | End: 1900-01-01

## 2023-01-01 RX ORDER — GABAPENTIN 250 MG/5ML
250 SOLUTION ORAL 3 TIMES DAILY
Qty: 1080 | Refills: 1 | Status: COMPLETED | COMMUNITY
Start: 2023-01-01 | End: 2023-01-01

## 2023-03-10 NOTE — ASSESSMENT
[FreeTextEntry1] : This is a 67-year-old woman with advanced progressive multiple sclerosis.  At this point I would like to switch her to Ocrevus.  She is unable to swallow pills well therefore I cannot provide oral medications.  She has trouble finding injection sites given her cachexia therefore platform drugs as well as Kesimpta is not able to be prescribed.  Will order hepatitis B's titers as well as quantitative immunoglobulins for her.  If okay will start Ocrevus.  I will see her back in 3 months for follow-up.  I will also do MRIs of brain, cervical and thoracic spines prior to starting the medication.  Consideration should be made for DVT prevention.

## 2023-03-10 NOTE — HISTORY OF PRESENT ILLNESS
[FreeTextEntry1] : 10/9/17:\par This is a 61-year-old woman returns today for followup of multiple sclerosis. This point its likely secondary progressive multiple sclerosis. She continues on 3 times a week Rebif. She appeared to tolerate the medication well. She seems to be getting increasingly frustrated as her condition deteriorates.At her last visit we tried tizanidine for spasticity which did not help and she stopped it. She is complaining that she is barely able to move her legs at this point. She does get up and do cooking but she gets exhausted. She describes cooking stuff peppers as is that of a one-day process no three-day process for one day she'll cook to meet the next day she couldn't arise in the last patient appeared to peppers. She does state that she gets up and does move around as much as she can but fatigue sets in much easier than it used to. She is not having any vision problems at this point. Her arms are little bit weak but not nearly as weak as her legs. She also has a history the past of intracerebral aneurysm status post surgery which is currently asymptomatic. She is here today for neurologic followup.\par \par Followup December 18, 2017:\par This is a 8-year-old woman with secondary progressive multiple sclerosis. She is to have weakness in her legs. She feels that occasionally her knees locked up on her. Since her last visit she's got electric wheelchair and scooter. She is able to get around now a lot better. She is here today with this in mind for followup visit.\par \par Followup March 19, 2018:\par This is a 62-year-old woman returns today for followup of multiple strokes as. She currently likely to secondary progressive state with relapse. Her legs continue to be weak. She mostly uses a wheelchair to get around but can use a walker for short periods of time before she becomes too fatigued. She is dealing with a lot of MS-related fatigue as well as her leg weakness. She maintains good upper body strength. She continues to have poor appetite and weight loss despite trial of medical marijuana to increase her appetite. She is here today for followup.\par \par Followup May 21, 2018:\par This is a 62-year-old woman who follows up today with multiple sclerosis. She has likely secondary progressive with relapse. She now uses a motorized scooter outside the home. She can use a walker for limited amounts in the house. Otherwise she is using a motorized wheelchair with short turning radius in the house. She is not having any new issues other than some intermittent coldness and cramping into her left leg. She is here today for neurologic followup.\par \par Followup August 21, 2018:\par This is a 62-year-old woman with multiple sclerosis who follows up today. She likely has secondary progressive multiple sclerosis with relapse. She continues on read as without any issues. She's able take it 3 times a week is not reporting any adverse effects. She continues to have severe weakness in her legs and is using a motorized scooter. She takes that she can move around a little with her walker when in her home. She ha complains of more fatigue however she is only taking Provigil once a day to twice a day as prescribed. She is here today for neurologic followup.  \par \par Followup November 26, 2018:\par This is a 63-year-old woman with likely secondary progressive multiple sclerosis who presents today for followup. She continues to be on Rebif with no issues. She continues to have bilateral leg weakness. She is nonambulatory and bound either wheelchair or scooter. She goes to physical and occupational therapy outside the home. She is receiving bimonthly treatment subsided Medrol. She wishes to increase it to monthly. She is here today for neurologic followup.\par \par Followup February 26, 2019:\par This is a 63-year-old woman who presents today for followup of multiple sclerosis. At this point to secondary progressive multiple stresses. Since her last visit she has fallen. She was unable to get up and was on the floor for about 24 hours. She now recognizes it is no longer safe for her to be alone. She remains paraplegic and cannot lift her legs and has decreased sensation in her legs. She is still able to use her arms. She is reporting decreased appetite. Overall she appears to have deteriorated significantly since her November 2018 visit. She is no longer able to go to physical and occupational therapy. She will continue with steroid treatment.\par \par Followup May 28, 2019:\par This is a 63-year-old woman who presents today for followup of multiple sclerosis. It is likely secondary progressive stage. She continues to be paraplegic. She is confined to a wheelchair at this point. She tried physical therapy in the home which is not as good as outpatient physical therapy. She wishes to try to go back to outpatient physical and occupational therapies. She is here today for neurologic followup.\par \par Followup September 17, 2019:\par This is a 63-year-old woman who presents today for followup of multiple sclerosis. At this point in secondary progressive multiple sclerosis. She continues to have paraplegia with diffuse weakness and also involving her upper extremities. She is complaining of lower extremity edema. He tried stockings which she did not care for her because it was uncomfortable. She states that she's been on diuretics as well with minimal relief. She states that the additional weight of the edema is causing her difficulty in transferring from wheelchair to commode. She is fallen a few times on wheelchair when she's been unsupervised. Currently she private pay three-eighths this to be with her for varying amounts of hours during the day. She is having increasingly worsening problems at night because she does have assistance. She is unable to go to physical or occupational therapy due to the falls and difficulty getting out of the home. She is here today for neurologic followup.\par \par Followup January 21, 2021:\par This is a 65-year-old woman who presents today for followup of multiple sclerosis. She is seen by video visit during the corona virus outbreak. Verbal consent is obtained at the beginning of the visit. She has not been seen since September of 2019. She continues to have what appears to be secondary progressive multiple sclerosis. Her leg weakness has gotten to a point now where she cannot ambulate with a rolling walker and is only using a wheelchair. He has having increasing weakness in her arms as well which is limiting her ability to transfer. She is relying on the assistance of an 8 to get in and out of bed and in and out of a chair. She states that she has fallen and has been in rehabilitation subsequent to her fall. She continues to take her Rebif 3 times a week and does not wish to switch medication. She is also utilizing gabapentin 1200 mg 2 to 3 times a day. She's here today for neurologic followup by video visit.\par \par Follow-up March 10, 2023:\par This is a 67-year-old woman who presents today with multiple sclerosis.  She has had it for several years and is likely in a progressive state at this point.  She lost vision about a year ago and did not call.  She is currently unable to move her legs and is wheelchair-bound.  She takes previous but wishes to switch as she is having difficulty finding sites to inject.  She is here today for neurologic follow-up.

## 2023-03-10 NOTE — CONSULT LETTER
[Dear  ___] : Dear  [unfilled], [Courtesy Letter:] : I had the pleasure of seeing your patient, [unfilled], in my office today. [Please see my note below.] : Please see my note below. [Consult Closing:] : Thank you very much for allowing me to participate in the care of this patient.  If you have any questions, please do not hesitate to contact me. [Sincerely,] : Sincerely, [FreeTextEntry3] : Oscar Manuel M.D., Ph.D. DPN-N\par [facility]\par Neurology at [place]\par Medical Director of Stroke Services\par [facility]\par

## 2023-03-10 NOTE — PHYSICAL EXAM
[Person] : oriented to person [Place] : oriented to place [Time] : oriented to time [Remote Intact] : remote memory intact [Registration Intact] : recent registration memory intact [Span Intact] : the attention span was normal [Concentration Intact] : normal concentrating ability [Visual Intact] : visual attention was ~T not ~L decreased [Naming Objects] : no difficulty naming common objects [Repeating Phrases] : no difficulty repeating a phrase [Fluency] : fluency intact [Comprehension] : comprehension intact [Current Events] : adequate knowledge of current events [Past History] : adequate knowledge of personal past history [Cranial Nerves Oculomotor (III)] : extraocular motion intact [Cranial Nerves Trigeminal (V)] : facial sensation intact symmetrically [Cranial Nerves Facial (VII)] : face symmetrical [Cranial Nerves Vestibulocochlear (VIII)] : hearing was intact bilaterally [Cranial Nerves Glossopharyngeal (IX)] : tongue and palate midline [Cranial Nerves Accessory (XI - Cranial And Spinal)] : head turning and shoulder shrug symmetric [Cranial Nerves Hypoglossal (XII)] : there was no tongue deviation with protrusion [Involuntary Movements] : no involuntary movements were seen [Paraparesis (Lower Extremities)] : the patient was paraplegic [Motor Strength Upper Extremities Bilaterally] : there was weakness in both upper extremities [Motor Strength Lower Extremities Bilaterally] : there was weakness in both lower extremities [Sensation Tactile Decrease] : light touch was intact [Sensation Pain / Temperature Decrease] : pain and temperature was intact [Sensation Vibration Decrease] : vibration was intact [Proprioception] : proprioception was intact [Non-ambulatory] : Non-ambulatory [Tremor] : no tremor present [Coordination - Dysmetria Impaired Finger-to-Nose Bilateral] : not present [3+] : Patella left 3+ [FreeTextEntry5] : Bilateral pupils are sluggish to light [FreeTextEntry6] : Bilateral upper extremity weakness, no movement in lower extremities [FreeTextEntry8] : In wheelchair [Sclera] : the sclera and conjunctiva were normal [Extraocular Movements] : extraocular movements were intact [Full Visual Field] : full visual field

## 2023-06-30 NOTE — CONSULT LETTER
[Dear  ___] : Dear  [unfilled], [Courtesy Letter:] : I had the pleasure of seeing your patient, [unfilled], in my office today. [Please see my note below.] : Please see my note below. [Consult Closing:] : Thank you very much for allowing me to participate in the care of this patient.  If you have any questions, please do not hesitate to contact me. [Sincerely,] : Sincerely, [FreeTextEntry3] : Oscar Manuel M.D., Ph.D. DPN-N\par [facility]\par [facility] [place]\par Medical Director of Stroke Services\par [facility]\par

## 2023-06-30 NOTE — HISTORY OF PRESENT ILLNESS
[FreeTextEntry1] : 10/9/17:\par This is a 61-year-old woman returns today for followup of multiple sclerosis. This point its likely secondary progressive multiple sclerosis. She continues on 3 times a week Rebif. She appeared to tolerate the medication well. She seems to be getting increasingly frustrated as her condition deteriorates.At her last visit we tried tizanidine for spasticity which did not help and she stopped it. She is complaining that she is barely able to move her legs at this point. She does get up and do cooking but she gets exhausted. She describes cooking stuff peppers as is that of a one-day process no three-day process for one day she'll cook to meet the next day she couldn't arise in the last patient appeared to peppers. She does state that she gets up and does move around as much as she can but fatigue sets in much easier than it used to. She is not having any vision problems at this point. Her arms are little bit weak but not nearly as weak as her legs. She also has a history the past of intracerebral aneurysm status post surgery which is currently asymptomatic. She is here today for neurologic followup.\par \par Followup December 18, 2017:\par This is a 8-year-old woman with secondary progressive multiple sclerosis. She is to have weakness in her legs. She feels that occasionally her knees locked up on her. Since her last visit she's got electric wheelchair and scooter. She is able to get around now a lot better. She is here today with this in mind for followup visit.\par \par Followup March 19, 2018:\par This is a 62-year-old woman returns today for followup of multiple strokes as. She currently likely to secondary progressive state with relapse. Her legs continue to be weak. She mostly uses a wheelchair to get around but can use a walker for short periods of time before she becomes too fatigued. She is dealing with a lot of MS-related fatigue as well as her leg weakness. She maintains good upper body strength. She continues to have poor appetite and weight loss despite trial of medical marijuana to increase her appetite. She is here today for followup.\par \par Followup May 21, 2018:\par This is a 62-year-old woman who follows up today with multiple sclerosis. She has likely secondary progressive with relapse. She now uses a motorized scooter outside the home. She can use a walker for limited amounts in the house. Otherwise she is using a motorized wheelchair with short turning radius in the house. She is not having any new issues other than some intermittent coldness and cramping into her left leg. She is here today for neurologic followup.\par \par Followup August 21, 2018:\par This is a 62-year-old woman with multiple sclerosis who follows up today. She likely has secondary progressive multiple sclerosis with relapse. She continues on read as without any issues. She's able take it 3 times a week is not reporting any adverse effects. She continues to have severe weakness in her legs and is using a motorized scooter. She takes that she can move around a little with her walker when in her home. She ha complains of more fatigue however she is only taking Provigil once a day to twice a day as prescribed. She is here today for neurologic followup.  \par \par Followup November 26, 2018:\par This is a 63-year-old woman with likely secondary progressive multiple sclerosis who presents today for followup. She continues to be on Rebif with no issues. She continues to have bilateral leg weakness. She is nonambulatory and bound either wheelchair or scooter. She goes to physical and occupational therapy outside the home. She is receiving bimonthly treatment subsided Medrol. She wishes to increase it to monthly. She is here today for neurologic followup.\par \par Followup February 26, 2019:\par This is a 63-year-old woman who presents today for followup of multiple sclerosis. At this point to secondary progressive multiple stresses. Since her last visit she has fallen. She was unable to get up and was on the floor for about 24 hours. She now recognizes it is no longer safe for her to be alone. She remains paraplegic and cannot lift her legs and has decreased sensation in her legs. She is still able to use her arms. She is reporting decreased appetite. Overall she appears to have deteriorated significantly since her November 2018 visit. She is no longer able to go to physical and occupational therapy. She will continue with steroid treatment.\par \par Followup May 28, 2019:\par This is a 63-year-old woman who presents today for followup of multiple sclerosis. It is likely secondary progressive stage. She continues to be paraplegic. She is confined to a wheelchair at this point. She tried physical therapy in the home which is not as good as outpatient physical therapy. She wishes to try to go back to outpatient physical and occupational therapies. She is here today for neurologic followup.\par \par Followup September 17, 2019:\par This is a 63-year-old woman who presents today for followup of multiple sclerosis. At this point in secondary progressive multiple sclerosis. She continues to have paraplegia with diffuse weakness and also involving her upper extremities. She is complaining of lower extremity edema. He tried stockings which she did not care for her because it was uncomfortable. She states that she's been on diuretics as well with minimal relief. She states that the additional weight of the edema is causing her difficulty in transferring from wheelchair to commode. She is fallen a few times on wheelchair when she's been unsupervised. Currently she private pay three-eighths this to be with her for varying amounts of hours during the day. She is having increasingly worsening problems at night because she does have assistance. She is unable to go to physical or occupational therapy due to the falls and difficulty getting out of the home. She is here today for neurologic followup.\par \par Followup January 21, 2021:\par This is a 65-year-old woman who presents today for followup of multiple sclerosis. She is seen by video visit during the corona virus outbreak. Verbal consent is obtained at the beginning of the visit. She has not been seen since September of 2019. She continues to have what appears to be secondary progressive multiple sclerosis. Her leg weakness has gotten to a point now where she cannot ambulate with a rolling walker and is only using a wheelchair. He has having increasing weakness in her arms as well which is limiting her ability to transfer. She is relying on the assistance of an 8 to get in and out of bed and in and out of a chair. She states that she has fallen and has been in rehabilitation subsequent to her fall. She continues to take her Rebif 3 times a week and does not wish to switch medication. She is also utilizing gabapentin 1200 mg 2 to 3 times a day. She's here today for neurologic followup by video visit.\par \par Follow-up March 10, 2023:\par This is a 67-year-old woman who presents today with multiple sclerosis.  She has had it for several years and is likely in a progressive state at this point.  She lost vision about a year ago and did not call.  She is currently unable to move her legs and is wheelchair-bound.  She takes previous but wishes to switch as she is having difficulty finding sites to inject.  She is here today for neurologic follow-up.\par \par Follow-up June 30, 2023:\par This is a 67-year-old woman who presents today with secondary progressive multiple sclerosis.  She continues to decline.  She is currently paraplegic in a motorized wheelchair.  She has significant weakness and atrophy in her arms.  She has weight loss and appears to be cachectic.  She has difficulty swallowing pills but can swallow liquids and eat.  She is lost most of her eyesight.  She recently was started on Ocrevus and is finished the first set of infusions.  She is here today for neurologic follow-up.

## 2023-06-30 NOTE — PHYSICAL EXAM
[Person] : oriented to person [Place] : oriented to place [Time] : oriented to time [Remote Intact] : remote memory intact [Registration Intact] : recent registration memory intact [Span Intact] : the attention span was normal [Concentration Intact] : normal concentrating ability [Visual Intact] : visual attention was ~T not ~L decreased [Naming Objects] : no difficulty naming common objects [Repeating Phrases] : no difficulty repeating a phrase [Fluency] : fluency intact [Comprehension] : comprehension intact [Current Events] : adequate knowledge of current events [Past History] : adequate knowledge of personal past history [Cranial Nerves Oculomotor (III)] : extraocular motion intact [Cranial Nerves Trigeminal (V)] : facial sensation intact symmetrically [Cranial Nerves Facial (VII)] : face symmetrical [Cranial Nerves Vestibulocochlear (VIII)] : hearing was intact bilaterally [Cranial Nerves Glossopharyngeal (IX)] : tongue and palate midline [Cranial Nerves Accessory (XI - Cranial And Spinal)] : head turning and shoulder shrug symmetric [Cranial Nerves Hypoglossal (XII)] : there was no tongue deviation with protrusion [Involuntary Movements] : no involuntary movements were seen [Paraparesis (Lower Extremities)] : the patient was paraplegic [Motor Strength Upper Extremities Bilaterally] : there was weakness in both upper extremities [Motor Strength Lower Extremities Bilaterally] : there was weakness in both lower extremities [Sensation Tactile Decrease] : light touch was intact [Sensation Pain / Temperature Decrease] : pain and temperature was intact [Sensation Vibration Decrease] : vibration was intact [Proprioception] : proprioception was intact [Non-ambulatory] : Non-ambulatory [3+] : Brachioradialis left 3+ [1+] : Patella left 1+ [Sclera] : the sclera and conjunctiva were normal [Extraocular Movements] : extraocular movements were intact [Full Visual Field] : full visual field [Tremor] : no tremor present [Coordination - Dysmetria Impaired Finger-to-Nose Bilateral] : not present [FreeTextEntry5] : Bilateral pupils are sluggish to light [FreeTextEntry6] : Bilateral upper extremity weakness, no movement in lower extremities, significant atrophy throughout [FreeTextEntry8] : In wheelchair [FreeTextEntry1] : Pupils are sluggish

## 2023-06-30 NOTE — ASSESSMENT
[FreeTextEntry1] : This is a 67-year-old woman with progressing multiple sclerosis.  At this time she will continue Ocrevus.  I will switch gabapentin to a solution.  I have suggested that she may need to see surgeon for wound care management as her aide states that she is developing sores on her buttocks.  I we will send her for speech therapy to evaluate her swallowing as well as I help with her complaint of low voice.  I will see her back following her second Ocrevus infusion but check blood work prior to it to make sure that it safe for her to continue on the medication.

## 2024-02-01 PROBLEM — R49.0 VOICE HOARSENESS: Status: ACTIVE | Noted: 2023-01-01

## 2024-02-02 ENCOUNTER — APPOINTMENT (OUTPATIENT)
Dept: OTOLARYNGOLOGY | Facility: CLINIC | Age: 69
End: 2024-02-02

## 2024-02-02 DIAGNOSIS — R49.0 DYSPHONIA: ICD-10-CM

## 2024-02-05 NOTE — HISTORY OF PRESENT ILLNESS
[de-identified] : Update 2/2/24: f/u voice hoarseness and dysphagia, now seeing SLP.   67yoF with significant PMH of MS (diagnosed in 2010), paraplegia presenting with hoarseness and difficulty swallowing medications progressively x 2 months. No globus sensation or feel as if food/pills are coming up. No coughing with eating/drinking, shortness of breath or throat pain. Has hx of smoking but quit many years ago.

## 2024-02-05 NOTE — REASON FOR VISIT
[Subsequent Evaluation] : a subsequent evaluation for [FreeTextEntry2] : voice hoarseness and dysphagia

## 2024-02-05 NOTE — PROCEDURE
[de-identified] : Laryngoscopy: NPL: Nasopharynx clear without masses Base of tongue without masses or lesions Vallecula clear Pyriforms clear Epiglottis crisp TVFs mobile bilaterally, bowing and atrophic cords, glottic gap Arytenoids normal Glottic airway patent

## 2024-02-05 NOTE — PHYSICAL EXAM
[Nasal Endoscopy Performed] : nasal endoscopy was performed, see procedure section for findings [Normal] : mucosa is normal [Midline] : trachea located in midline position [FreeTextEntry1] : weak voice, intermittent breathiness, wheelchair bound [de-identified] : poor dentition

## 2024-04-02 ENCOUNTER — APPOINTMENT (OUTPATIENT)
Dept: NEUROLOGY | Facility: CLINIC | Age: 69
End: 2024-04-02